# Patient Record
Sex: FEMALE | Race: WHITE | Employment: FULL TIME | URBAN - METROPOLITAN AREA
[De-identification: names, ages, dates, MRNs, and addresses within clinical notes are randomized per-mention and may not be internally consistent; named-entity substitution may affect disease eponyms.]

---

## 2022-08-16 ENCOUNTER — OFFICE VISIT (OUTPATIENT)
Dept: ORTHOPEDIC SURGERY | Age: 47
End: 2022-08-16
Payer: OTHER GOVERNMENT

## 2022-08-16 VITALS — WEIGHT: 160 LBS | HEIGHT: 62 IN | BODY MASS INDEX: 29.44 KG/M2 | TEMPERATURE: 97.1 F

## 2022-08-16 DIAGNOSIS — M75.02 ADHESIVE CAPSULITIS OF LEFT SHOULDER: Primary | ICD-10-CM

## 2022-08-16 DIAGNOSIS — M75.112 INCOMPLETE TEAR OF LEFT ROTATOR CUFF, UNSPECIFIED WHETHER TRAUMATIC: ICD-10-CM

## 2022-08-16 PROCEDURE — 99203 OFFICE O/P NEW LOW 30 MIN: CPT | Performed by: ORTHOPAEDIC SURGERY

## 2022-08-16 RX ORDER — MELOXICAM 15 MG/1
15 TABLET ORAL
Qty: 30 TABLET | Refills: 1 | Status: SHIPPED | OUTPATIENT
Start: 2022-08-16 | End: 2022-08-18 | Stop reason: SDUPTHER

## 2022-08-16 NOTE — PROGRESS NOTES
Angel Ayala  1975   Chief Complaint   Patient presents with    Shoulder Pain     left        HISTORY OF PRESENT ILLNESS  Angel Ayala is a 50 y.o. female who presents today for evaluation of left shoulder pain. Pt referred by Dr. Kristin Zhang. She rates her pain 3/10 today. Pain has been present for 2 months. Cannot recall a specific injury other than when she had to carry an irate child. Reports decreased ROM. Has pain with reaching behind. Has tried following treatments: Injections:NO; Brace:NO; Therapy:NO; Cane/Crutch:NO       Allergies   Allergen Reactions    Sulfa (Sulfonamide Antibiotics) Nausea and Vomiting     Face red, headache        Past Medical History:   Diagnosis Date    Anxiety about health 2007    Atypical chest pain 2007    Dizziness 2007    Occasional, with normal BP and HR. Echocardiogram 11/15/2015    EF 65%. No RWMA. Study is within normal limits.     GERD (gastroesophageal reflux disease)     History of palpitations 2007    Occasional    Hyperlipidemia     Hypertension     Ill-defined condition     Queen Parkinson-White    Nausea & vomiting     requests premed    Nocturia     Overactive bladder     Pyelonephritis     Recurrent UTI     Thyroid disease     hypo    Urinary frequency     Urinary urgency     WPW (Anthony-Parkinson-White syndrome)       Social History     Socioeconomic History    Marital status:      Spouse name: Not on file    Number of children: Not on file    Years of education: Not on file    Highest education level: Not on file   Occupational History    Not on file   Tobacco Use    Smoking status: Never    Smokeless tobacco: Not on file   Substance and Sexual Activity    Alcohol use: No    Drug use: No    Sexual activity: Not on file   Other Topics Concern    Not on file   Social History Narrative    ** Merged History Encounter **          Social Determinants of Health     Financial Resource Strain: Not on file   Food Insecurity: Not on file   Transportation Needs: Not on file   Physical Activity: Not on file   Stress: Not on file   Social Connections: Not on file   Intimate Partner Violence: Not on file   Housing Stability: Not on file      Past Surgical History:   Procedure Laterality Date    HX GYN      3 dand c's    HX HYSTERECTOMY      HX ORTHOPAEDIC      toe growth removed    HX UROLOGICAL  3/10/15    [facility], Cystoscopy, insertion of bilateral indwelling stents. Dr. Sarahi Ortega      Family History   Problem Relation Age of Onset    Heart Attack Maternal Grandmother     Stroke Maternal Grandfather       Current Outpatient Medications   Medication Sig    solifenacin (VESICARE) 5 mg tablet Take 1 Tab by mouth daily. levothyroxine (SYNTHROID) 125 mcg tablet Take 125 mcg by mouth three (3) days a week. Indications: HYPOTHYROIDISM    telmisartan-hydroCHLOROthiazide (MICARDIS HCT) 40-12.5 mg per tablet Take 1 Tab by mouth daily. Indications: HYPERTENSION     No current facility-administered medications for this visit. REVIEW OF SYSTEM   Patient denies: Weight loss, Fever/Chills, HA, Visual changes, Fatigue, Chest pain, SOB, Abdominal pain, N/V/D/C, Blood in stool or urine, Edema. Pertinent positive as above in HPI. All others were negative    PHYSICAL EXAM:   Visit Vitals  Temp 97.1 °F (36.2 °C)   Ht 5' 2\" (1.575 m)   Wt 160 lb (72.6 kg)   BMI 29.26 kg/m²     The patient is a well-developed, well-nourished female   in no acute distress. The patient is alert and oriented times three. The patient is alert and oriented times three. Mood and affect are normal.  LYMPHATIC: lymph nodes are not enlarged and are within normal limits  SKIN: normal in color and non tender to palpation. There are no bruises or abrasions noted. NEUROLOGICAL: Motor sensory exam is within normal limits. Reflexes are equal bilaterally.  There is normal sensation to pinprick and light touch  MUSCULOSKELETAL:  Examination Left shoulder   Skin Intact   AC joint tenderness -   Biceps tenderness - Forward flexion/Elevation    Active abduction    Glenohumeral abduction 70   External rotation ROM 45   Internal rotation ROM 30   Apprehension -   Stans Relocation -   Jerk -   Load and Shift -   Obriens -   Speeds -   Impingement sign +   Supraspinatus/Empty Can +   External Rotation Strength -, 5/5   Lift Off/Belly Press -, 5/5   Neurovascular Intact       IMAGING: MRI of left shoulder dated 8/03/2022 was reviewed and read by Dr. Cabrales Gloria:   IMPRESSION:  1. Small high-grade primarily interstitial and focally deep surface partial tear, posterior supraspinatus critical zone, probably with component of intratendinous edema. Minimal intermediate grade partial tear supraspinatus tendon far anterior leading edge. No full-thickness tear or muscle atrophy. Minimal subacromial-subdeltoid bursal effusion and local peribursal edema, likely reactive low-grade bursitis   2. Minimal proximal intra-articular long head biceps degenerative tendinosis without discrete tendon tear. 3. Intact glenoid labrum. 4. Unremarkable coracoacromial arch. IMPRESSION:      ICD-10-CM ICD-9-CM    1. Adhesive capsulitis of left shoulder  M75.02 726.0       2. Incomplete tear of left rotator cuff, unspecified whether traumatic  M75.112 840.4            PLAN:  1. Pt presents today with left shoulder pain due to adhesive capsulitis and MRI-documented partial RCT and I would like for her to begin PT. Was also provided with prescription for Mobic. Patient was provided with physician-directed home exercise program in the office today. Risk factors include: htn  2. No ultrasound exam indicated today  3. No cortisone injection indicated today   4. Yes Physical/Occupational Therapy indicated today  5. No diagnostic test indicated today:   6. No durable medical equipment indicated today  7. No referral indicated today   8. Yes medications indicated today: MOBIC  9.  No Narcotic indicated today      RTC 4 weeks    Office note will be sent to referring provider. Scribed by Bianca Costa [address] [place] [address] [address]) as dictated by MD NATALIIA Miller, Dr. Capri Brooks, confirm that all documentation is accurate.     Capri Brooks M.D.   [facility]

## 2022-08-18 DIAGNOSIS — M75.02 ADHESIVE CAPSULITIS OF LEFT SHOULDER: ICD-10-CM

## 2022-08-18 DIAGNOSIS — M75.112 INCOMPLETE TEAR OF LEFT ROTATOR CUFF, UNSPECIFIED WHETHER TRAUMATIC: ICD-10-CM

## 2022-08-18 RX ORDER — MELOXICAM 15 MG/1
15 TABLET ORAL
Qty: 90 TABLET | Refills: 0 | Status: SHIPPED | OUTPATIENT
Start: 2022-08-18

## 2022-08-18 NOTE — TELEPHONE ENCOUNTER
Per pharmacy, patient is requesting to dispense a 90 d/s. Please sign if appropriate. Requested Prescriptions     Pending Prescriptions Disp Refills    meloxicam (Mobic) 15 mg tablet 90 Tablet 0     Sig: Take 1 Tablet by mouth daily (with breakfast). For Christopher Estevez in place:   Recommendation Provided To:    Intervention Detail: New Rx: 1, reason: Patient Preference  Gap Closed?:   Intervention Accepted By:   Time Spent (min): 5

## 2022-08-22 ENCOUNTER — HOSPITAL ENCOUNTER (OUTPATIENT)
Dept: PHYSICAL THERAPY | Age: 47
Discharge: HOME | End: 2022-08-22
Attending: ORTHOPAEDIC SURGERY
Payer: OTHER GOVERNMENT

## 2022-08-22 DIAGNOSIS — M75.02 ADHESIVE CAPSULITIS OF LEFT SHOULDER: Primary | ICD-10-CM

## 2022-08-22 DIAGNOSIS — M75.112 INCOMPLETE TEAR OF LEFT ROTATOR CUFF, UNSPECIFIED WHETHER TRAUMATIC: ICD-10-CM

## 2022-08-22 PROCEDURE — 97535 SELF CARE MNGMENT TRAINING: CPT

## 2022-08-22 PROCEDURE — 97161 PT EVAL LOW COMPLEX 20 MIN: CPT

## 2022-08-22 PROCEDURE — 97110 THERAPEUTIC EXERCISES: CPT

## 2022-08-22 NOTE — PROGRESS NOTES
PT DAILY TREATMENT NOTE     Patient Name: Lb Costa  Date:2022  : 1975  [x]  Patient  Verified  Payor: BRANDI / Plan: Evans Wilkins 72 / Product Type:  /    In time:10  Out time:1045am  Total Treatment Time (min): 45  Visit #: 1 of 8    Treatment Area: Adhesive capsulitis of left shoulder [M75.02]  Incomplete tear of left rotator cuff, unspecified whether traumatic [M75.112]    SUBJECTIVE  Pain Level (0-10 scale): 5  Any medication changes, allergies to medications, adverse drug reactions, diagnosis change, or new procedure performed?: [x] No    [] Yes (see summary sheet for update)  Subjective functional status/changes:   [] No changes reported  See eval    OBJECTIVE    20 min [x]Eval                  []Re-Eval       15 min Therapeutic Exercise:  [] See flow sheet :   Rationale: increase ROM and increase strength to improve the patients ability to manage ADLs    15 min Self care/home management:  []  See flow sheet :  pt education on relevant anatomy and pathology as it relates to self care. Discussed edits to exercise routine to emphasize posterior chain and no weight with the left arm. Discussed icing after irritating activity. Rationale:  reduce pain   to improve the patients ability to manage self care. With   [] TE   [] TA   [] neuro   [] other: Patient Education: [x] Review HEP    [] Progressed/Changed HEP based on:   [] positioning   [] body mechanics   [] transfers   [] heat/ice application    [] other:      Other Objective/Functional Measures: see eval   IMAGING: MRI of left shoulder dated 2022 was reviewed and read by Dr. Luis Castro:   IMPRESSION:  1. Small high-grade primarily interstitial and focally deep surface partial tear, posterior supraspinatus critical zone, probably with component of intratendinous edema. Minimal intermediate grade partial tear supraspinatus tendon far anterior leading edge. No full-thickness tear or muscle atrophy. Minimal subacromial-subdeltoid bursal effusion and local peribursal edema, likely reactive low-grade bursitis   2. Minimal proximal intra-articular long head biceps degenerative tendinosis without discrete tendon tear. 3. Intact glenoid labrum. 4. Unremarkable coracoacromial arch. Pain Level (0-10 scale) post treatment: 5    ASSESSMENT/Changes in Function: see POC    Patient will continue to benefit from skilled PT services to modify and progress therapeutic interventions, address functional mobility deficits, address ROM deficits, address strength deficits, analyze and address soft tissue restrictions, analyze and cue movement patterns, analyze and modify body mechanics/ergonomics, assess and modify postural abnormalities, and instruct in home and community integration to attain remaining goals. [x]  See Plan of Care  []  See progress note/recertification  []  See Discharge Summary         Progress towards goals / Updated goals:  Short Term Goals: To be accomplished in 2 weeks:  Pt will report daily compliance with her HEP to maximize therapeutic outcomes. Eval: HEP assigned  Long Term Goals: To be accomplished in 4 weeks:  Pt will improve her left shoulder flexion AROM to 160 deg without shoulder hike and without pain increase to improve ease of reaching into cabinets. Eval: AROM 130 deg with pain  Pt will improve her left shoulder ABD AROM to 145 deg without shoulder hike or pain increase to improve ease of dressing and reaching overhead. Eval: AROM 80 deg with pain  Pt will improve her left shoulder ER/ABD strength to 4+/5 MMT to improve stability with lifting. Eval: 4-/5 with pain  Pt will improve her left biceps strength to 4+/5 MMT without pain to improve ease of lifting and reaching overhead. Eval: 4-/5 with sharp pain  Pt will improve her FOTO to 67, demonstrating improved functional ADL capacity.   Eval: 46    PLAN  []  Upgrade activities as tolerated     [x]  Continue plan of care  [] Update interventions per flow sheet       []  Discharge due to:_  []  Other:_      Aarti Evans, PT 8/22/2022  11:14 AM    Future Appointments   Date Time Provider Maddie Trejo   8/25/2022 12:00 PM Iain Presser, PT [facility]   8/30/2022  3:45 PM Iain Presser, PT [facility]   9/2/2022  3:00 PM Melbourne Presser, PT [facility]   9/7/2022  4:30 PM Laina Fuentes, PTA [facility]   9/9/2022  4:30 PM Laina Fuentes, PTA [facility]   9/12/2022  3:20 PM Segundo Mensah MD [facility]
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
## 2022-08-25 ENCOUNTER — HOSPITAL ENCOUNTER (OUTPATIENT)
Dept: PHYSICAL THERAPY | Age: 47
Discharge: HOME | End: 2022-08-25
Attending: ORTHOPAEDIC SURGERY
Payer: OTHER GOVERNMENT

## 2022-08-25 PROCEDURE — 97140 MANUAL THERAPY 1/> REGIONS: CPT

## 2022-08-25 PROCEDURE — 97110 THERAPEUTIC EXERCISES: CPT

## 2022-08-25 PROCEDURE — 97112 NEUROMUSCULAR REEDUCATION: CPT

## 2022-08-25 NOTE — PROGRESS NOTES
PT DAILY TREATMENT NOTE 10    Patient Name: Luca Lombardo  Date:2022  : 1975  [x]  Patient  Verified  Payor: BRANDI / Plan: Evans Wilkins 74 / Product Type:  /    In time:1200  Out time:1252  Total Treatment Time (min): 52  Visit #: 2 of 8  Treatment Area: Incomplete rotator cuff tear or rupture of left shoulder, not specified as traumatic [M75.112]  Pain in left shoulder [M25.512]    SUBJECTIVE  Pain Level (0-10 scale): 3  Any medication changes, allergies to medications, adverse drug reactions, diagnosis change, or new procedure performed?: [x] No    [] Yes (see summary sheet for update)  Subjective functional status/changes:   [] No changes reported  I've been doing my exercises at home. OBJECTIVE    Modality rationale: decrease pain to improve the patients ability to tolerate post exercise soreness   Min Type Additional Details   10 [x]  Ice     []  heat  []  Ice massage  []  Laser   []  Anodyne Position:s/l  Location:left hsoulder   [] Skin assessment post-treatment:  []intact []redness- no adverse reaction    []redness - adverse reaction:     26 min Therapeutic Exercise:  [] See flow sheet :   Rationale: increase ROM and increase strength to improve the patients ability to perform daily activities      8 min Neuromuscular Re-education:  []  See flow sheet :   Rationale: increase strength  to improve the patients ability to perform overhead reaching with less pain    8 min Manual Therapy:  STM left lev scap, scalenes, UT; grade 2-3 left ST mobs in right s/l; supine PROM with distraction in all directions   The manual therapy interventions were performed at a separate and distinct time from the therapeutic activities interventions.   Rationale: decrease pain, increase ROM, and increase tissue extensibility to perform ADLs  With   [] TE   [] TA   [] neuro   [] other: Patient Education: [x] Review HEP    [] Progressed/Changed HEP based on:   [] positioning   [] body mechanics   [] transfers   [] heat/ice application    [] other:      Other Objective/Functional Measures:      Pain Level (0-10 scale) post treatment: 0    ASSESSMENT/Changes in Function: Soft tissue restrictions noted in left scalenes, lev scap, and lats/teres complex; improvement noted in left ST mobility post manual.    Patient will continue to benefit from skilled PT services to modify and progress therapeutic interventions, address functional mobility deficits, address ROM deficits, address strength deficits, analyze and address soft tissue restrictions, and analyze and cue movement patterns to attain remaining goals. []  See Plan of Care  []  See progress note/recertification  []  See Discharge Summary         Progress towards goals / Updated goals:  Short Term Goals: To be accomplished in 2 weeks:  Pt will report daily compliance with her HEP to maximize therapeutic outcomes. Eval: HEP assigned  Current: goal met per patient report 8/25/22   Long Term Goals: To be accomplished in 4 weeks:  Pt will improve her left shoulder flexion AROM to 160 deg without shoulder hike and without pain increase to improve ease of reaching into cabinets. Eval: AROM 130 deg with pain  Pt will improve her left shoulder ABD AROM to 145 deg without shoulder hike or pain increase to improve ease of dressing and reaching overhead. Eval: AROM 80 deg with pain  Pt will improve her left shoulder ER/ABD strength to 4+/5 MMT to improve stability with lifting. Eval: 4-/5 with pain  Pt will improve her left biceps strength to 4+/5 MMT without pain to improve ease of lifting and reaching overhead. Eval: 4-/5 with sharp pain  Pt will improve her FOTO to 67, demonstrating improved functional ADL capacity.   Eval: 46    PLAN  [x]  Upgrade activities as tolerated     []  Continue plan of care  []  Update interventions per flow sheet       []  Discharge due to:_  []  Other:_      Aletha Mota, DUGLAS, CMTPT 8/25/2022  8:49 AM    Future Appointments   Date Time Provider Maddie Trejo   8/25/2022 12:00 PM Tish Michelle, PT [facility]   8/30/2022  3:45 PM Tish Michelle, PT [facility]   9/2/2022  3:00 PM Tish Michelle, PT [facility]   9/7/2022  4:30 PM Louie Meyer, PTA [facility]   9/9/2022  4:30 PM Louie Meyer, PTA [facility]   9/12/2022  3:20 PM Chula Ding MD [facility]

## 2022-08-30 ENCOUNTER — HOSPITAL ENCOUNTER (OUTPATIENT)
Dept: PHYSICAL THERAPY | Age: 47
Discharge: HOME | End: 2022-08-30
Attending: ORTHOPAEDIC SURGERY
Payer: OTHER GOVERNMENT

## 2022-08-30 PROCEDURE — 97140 MANUAL THERAPY 1/> REGIONS: CPT

## 2022-08-30 PROCEDURE — 97112 NEUROMUSCULAR REEDUCATION: CPT

## 2022-08-30 PROCEDURE — 97110 THERAPEUTIC EXERCISES: CPT

## 2022-08-30 NOTE — PROGRESS NOTES
PT DAILY TREATMENT NOTE 10-18    Patient Name: Anuj Girard  Date:2022  : 1975  [x]  Patient  Verified  Payor: BRANDI / Plan: Evans Wilkins 74 / Product Type:  /    In time:345  Out time:422  Total Treatment Time (min): 37  Visit #: 3 of 8    Treatment Area: Incomplete rotator cuff tear or rupture of left shoulder, not specified as traumatic [M75.112]  Pain in left shoulder [M25.512]    SUBJECTIVE  Pain Level (0-10 scale): 0  Any medication changes, allergies to medications, adverse drug reactions, diagnosis change, or new procedure performed?: [x] No    [] Yes (see summary sheet for update)  Subjective functional status/changes:   [] No changes reported  It's not been hurting me like it was. I went back to work yesterday and had to do a lot of lifting. OBJECTIVE    21 min Therapeutic Exercise:  [] See flow sheet :   Rationale: increase ROM and increase strength to improve the patients ability to perform daily activities      8 min Neuromuscular Re-education:  []  See flow sheet :   Rationale: increase strength  to improve the patients ability to perform overhead activ    8 min Manual Therapy:  STM left lev scap, scalenes, UT; grade 2-3 left ST mobs in right s/l; supine PROM with distraction in all directions   The manual therapy interventions were performed at a separate and distinct time from the therapeutic activities interventions. Rationale: decrease pain, increase ROM, and increase tissue extensibility to perform daily activities   With   [] TE   [] TA   [] neuro   [] other: Patient Education: [x] Review HEP    [] Progressed/Changed HEP based on:   [] positioning   [] body mechanics   [] transfers   [] heat/ice application    [] other:      Other Objective/Functional Measures:      Pain Level (0-10 scale) post treatment: 0    ASSESSMENT/Changes in Function: Active shoulder flexion has improved by 25 degrees and patient reports no pain with AROM today.  Added 1/2 prone scapular exercises to work on stability. Continues to have mm restrictions in left periscapular region. Patient will continue to benefit from skilled PT services to modify and progress therapeutic interventions, address functional mobility deficits, address ROM deficits, address strength deficits, analyze and address soft tissue restrictions, and analyze and cue movement patterns to attain remaining goals. []  See Plan of Care  []  See progress note/recertification  []  See Discharge Summary         Progress towards goals / Updated goals:  Short Term Goals: To be accomplished in 2 weeks:  Pt will report daily compliance with her HEP to maximize therapeutic outcomes. Eval: HEP assigned  Current: goal met per patient report 8/25/22           Long Term Goals: To be accomplished in 4 weeks:  Pt will improve her left shoulder flexion AROM to 160 deg without shoulder hike and without pain increase to improve ease of reaching into cabinets. Eval: AROM 130 deg with pain  Current: 155 degrees 8/30/22  Pt will improve her left shoulder ABD AROM to 145 deg without shoulder hike or pain increase to improve ease of dressing and reaching overhead. Eval: AROM 80 deg with pain  Pt will improve her left shoulder ER/ABD strength to 4+/5 MMT to improve stability with lifting. Eval: 4-/5 with pain  Pt will improve her left biceps strength to 4+/5 MMT without pain to improve ease of lifting and reaching overhead. Eval: 4-/5 with sharp pain  Pt will improve her FOTO to 67, demonstrating improved functional ADL capacity.   Eval: 51    PLAN  [x]  Upgrade activities as tolerated     []  Continue plan of care  []  Update interventions per flow sheet       []  Discharge due to:_  []  Other:_      Marcelle Weber, DUGLAS, CMTPT 8/30/2022  8:51 AM    Future Appointments   Date Time Provider Maddie Trejo   8/30/2022  3:45 PM [facility]   9/2/2022  3:00 PM [facility]   9/7/2022  4:30 PM [facility]   9/9/2022  4:30 PM [facility]   9/12/2022  3:20 PM Jose Dee MD [facility] BS AMB

## 2022-09-07 ENCOUNTER — HOSPITAL ENCOUNTER (OUTPATIENT)
Dept: PHYSICAL THERAPY | Age: 47
Discharge: HOME | End: 2022-09-07
Attending: ORTHOPAEDIC SURGERY
Payer: OTHER GOVERNMENT

## 2022-09-07 PROCEDURE — 97112 NEUROMUSCULAR REEDUCATION: CPT

## 2022-09-07 PROCEDURE — 97110 THERAPEUTIC EXERCISES: CPT

## 2022-09-07 PROCEDURE — 97140 MANUAL THERAPY 1/> REGIONS: CPT

## 2022-09-07 NOTE — PROGRESS NOTES
PT DAILY TREATMENT NOTE     Patient Name: Jensen Ramirez  Date:2022  : 1975  [x]  Patient  Verified  Payor:  / Plan: Holley Rivera / Product Type:  /    In time:4:30  Out time:5:16  Total Treatment Time (min): 46  Visit #: 4 of 8    Treatment Area: Incomplete rotator cuff tear or rupture of left shoulder, not specified as traumatic [M75.112]  Pain in left shoulder [M25.512]    SUBJECTIVE  Pain Level (0-10 scale): 0  Any medication changes, allergies to medications, adverse drug reactions, diagnosis change, or new procedure performed?: [x] No    [] Yes (see summary sheet for update)  Subjective functional status/changes:   [] No changes reported  Pt reports no pain coming in today. Pt states she has been doing her exercises and stretching and that has really helped with her discomfort. OBJECTIVE        28 min Therapeutic Exercise:  [x] See flow sheet :   Rationale: increase ROM, increase strength, and improve coordination to improve the patients ability to perform functional task with ease. 10 min Neuromuscular Re-education:  [x]  See flow sheet :   Rationale: increase strength, improve coordination, and increase proprioception  to improve the patients ability to perform ADL's with ease. 8 min Manual Therapy:  STM left lev scap, scalenes, UT; grade 2-3 left ST mobs in right s/l; supine PROM with distraction in all directions   The manual therapy interventions were performed at a separate and distinct time from the therapeutic activities interventions. Rationale: decrease pain, increase ROM, and increase tissue extensibility to improve functional mobility with overhead reaching ADL's.     With   [] TE   [] TA   [] neuro   [] other: Patient Education: [x] Review HEP    [] Progressed/Changed HEP based on:   [] positioning   [] body mechanics   [] transfers   [] heat/ice application    [] other:      Other Objective/Functional Measures:      Pain Level (0-10 scale) post treatment: 0    ASSESSMENT/Changes in Function:   No pain reported with therex performance with pt displaying good mobility and ROM throughout exercises. The pt displays excellent PROM of the left shoulder and reports no end range pain. Continued treatment to improve strength and stability of the left shoulder to aid with ease of everyday activities. Patient will continue to benefit from skilled PT services to modify and progress therapeutic interventions, address functional mobility deficits, address ROM deficits, address strength deficits, analyze and address soft tissue restrictions, analyze and cue movement patterns, analyze and modify body mechanics/ergonomics, and assess and modify postural abnormalities to attain remaining goals. [x]  See Plan of Care  []  See progress note/recertification  []  See Discharge Summary         Progress towards goals / Updated goals:  Short Term Goals: To be accomplished in 2 weeks:  Pt will report daily compliance with her HEP to maximize therapeutic outcomes. Eval: HEP assigned  Current: goal met per patient report 8/25/22           Long Term Goals: To be accomplished in 4 weeks:  Pt will improve her left shoulder flexion AROM to 160 deg without shoulder hike and without pain increase to improve ease of reaching into cabinets. Eval: AROM 130 deg with pain  Current: 155 degrees 8/30/22  Pt will improve her left shoulder ABD AROM to 145 deg without shoulder hike or pain increase to improve ease of dressing and reaching overhead. Eval: AROM 80 deg with pain  Pt will improve her left shoulder ER/ABD strength to 4+/5 MMT to improve stability with lifting. Eval: 4-/5 with pain  Pt will improve her left biceps strength to 4+/5 MMT without pain to improve ease of lifting and reaching overhead. Eval: 4-/5 with sharp pain  Pt will improve her FOTO to 67, demonstrating improved functional ADL capacity.   Eval: 46       PLAN  []  Upgrade activities as tolerated [x]  Continue plan of care  []  Update interventions per flow sheet       []  Discharge due to:_  []  Other:_      Ryan Lee PTA 9/7/2022  4:36 PM    Future Appointments   Date Time Provider Maddie Trejo   9/12/2022  3:20 PM Shyam Gutierrez MD [facility] BS AMB

## 2022-09-09 ENCOUNTER — APPOINTMENT (OUTPATIENT)
Dept: PHYSICAL THERAPY | Age: 47
End: 2022-09-09
Attending: ORTHOPAEDIC SURGERY
Payer: OTHER GOVERNMENT

## 2022-09-14 ENCOUNTER — TELEPHONE (OUTPATIENT)
Dept: PHYSICAL THERAPY | Age: 47
End: 2022-09-14

## 2022-09-26 ENCOUNTER — TELEPHONE (OUTPATIENT)
Dept: PHYSICAL THERAPY | Age: 47
End: 2022-09-26

## 2022-10-03 ENCOUNTER — TELEPHONE (OUTPATIENT)
Dept: PHYSICAL THERAPY | Age: 47
End: 2022-10-03

## 2022-10-12 ENCOUNTER — TELEPHONE (OUTPATIENT)
Dept: PHYSICAL THERAPY | Age: 47
End: 2022-10-12
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
## 2025-06-20 ENCOUNTER — OFFICE VISIT (OUTPATIENT)
Age: 50
End: 2025-06-20
Payer: OTHER GOVERNMENT

## 2025-06-20 VITALS
DIASTOLIC BLOOD PRESSURE: 82 MMHG | OXYGEN SATURATION: 100 % | BODY MASS INDEX: 30.36 KG/M2 | SYSTOLIC BLOOD PRESSURE: 118 MMHG | HEART RATE: 77 BPM | WEIGHT: 165 LBS | HEIGHT: 62 IN

## 2025-06-20 DIAGNOSIS — R00.2 PALPITATIONS: ICD-10-CM

## 2025-06-20 DIAGNOSIS — I45.6 WPW (WOLFF-PARKINSON-WHITE SYNDROME): Primary | ICD-10-CM

## 2025-06-20 DIAGNOSIS — I47.10 SVT (SUPRAVENTRICULAR TACHYCARDIA): ICD-10-CM

## 2025-06-20 PROCEDURE — 99204 OFFICE O/P NEW MOD 45 MIN: CPT | Performed by: INTERNAL MEDICINE

## 2025-06-20 PROCEDURE — 93000 ELECTROCARDIOGRAM COMPLETE: CPT | Performed by: INTERNAL MEDICINE

## 2025-06-20 RX ORDER — ALPRAZOLAM 0.5 MG
TABLET ORAL
COMMUNITY
Start: 2025-06-06

## 2025-06-20 RX ORDER — OMEPRAZOLE 40 MG/1
CAPSULE, DELAYED RELEASE ORAL
COMMUNITY
Start: 2025-05-18

## 2025-06-20 RX ORDER — PANTOPRAZOLE SODIUM 40 MG/1
40 TABLET, DELAYED RELEASE ORAL DAILY
COMMUNITY
Start: 2025-04-03

## 2025-06-20 RX ORDER — ATORVASTATIN CALCIUM 20 MG/1
20 TABLET, FILM COATED ORAL DAILY
COMMUNITY
Start: 2025-06-08

## 2025-06-20 RX ORDER — ESCITALOPRAM OXALATE 10 MG/1
15 TABLET ORAL DAILY
COMMUNITY
Start: 2025-04-02

## 2025-06-20 RX ORDER — TELMISARTAN 40 MG/1
40 TABLET ORAL DAILY
COMMUNITY
Start: 2025-06-06

## 2025-06-20 NOTE — PROGRESS NOTES
HPI:  A 50-year-old female here to reestablish care.  I last saw her in 2016.  She has history of preexcitation on EKG that was evaluated in the hospital in the setting of pyelonephritis and sepsis.  She has thyroid disorder.  She was doing well up to about four to five months ago.  She has been waking at night every few weeks with heart racing although she checked her pulse and it has not been fast.  She has a grandchild who was born with congenital heart and kidney issues, been under a lot of stress as a result.  She has not had any other syncope.  She has gained about 15 pounds over the past 6-12 months.  Her  has sleep apnea and so they are not sure if she actually snores consistently.    Impression:  1. 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  We had talked about EP study at that time versus close monitoring in 2016 and close monitoring was decided.  2. Recent increase in palpitations over the past few months with worsening stressors having a grandchild with congenital heart and kidney issue.  3. Abnormal EKG, now more left bundle morphology compared to 2016.  4. Hypertension, controlled on telmisartan.  5. Thyroid disorder, on levothyroxine.  Recent physical with normal levels.    Plan:  She has previous history of WPW, more preexcitation.  Her EKG is now more evolved in a left bundle.  She does not have any significant shortness of breath or chest pain or syncope but has nocturnal palpitations.  She has gained about 15 pounds and is under a lot of stress.  I am going to obtain event monitor for a month as well as echocardiogram.  She has been referred to sleep medicine.  She may have an underlying sleep disorder as a cause for these symptoms.  When I see her back, I will make final recommendations.        Wt Readings from Last 3 Encounters:   06/20/25 74.8 kg (165 lb)   08/16/22 72.6 kg (160 lb)     Past Medical History:   Diagnosis Date    Anxiety about health 2007    Atypical chest pain 2007

## 2025-07-01 PROBLEM — I10 ESSENTIAL HYPERTENSION: Status: ACTIVE | Noted: 2025-07-01

## 2025-07-01 PROBLEM — E03.9 HYPOTHYROIDISM: Status: ACTIVE | Noted: 2025-07-01

## 2025-07-01 PROBLEM — I45.6 WPW (WOLFF-PARKINSON-WHITE SYNDROME): Status: ACTIVE | Noted: 2025-07-01

## 2025-07-01 PROBLEM — F41.9 ANXIETY: Status: ACTIVE | Noted: 2025-07-01

## 2025-07-01 ASSESSMENT — SLEEP AND FATIGUE QUESTIONNAIRES
HOW LIKELY ARE YOU TO NOD OFF OR FALL ASLEEP WHILE WATCHING TV: SLIGHT CHANCE OF DOZING
HOW LIKELY ARE YOU TO NOD OFF OR FALL ASLEEP IN A CAR, WHILE STOPPED FOR A FEW MINUTES IN TRAFFIC: WOULD NEVER DOZE
HOW LIKELY ARE YOU TO NOD OFF OR FALL ASLEEP WHILE SITTING AND READING: SLIGHT CHANCE OF DOZING
HOW LIKELY ARE YOU TO NOD OFF OR FALL ASLEEP WHILE SITTING AND TALKING TO SOMEONE: WOULD NEVER DOZE
HOW LIKELY ARE YOU TO NOD OFF OR FALL ASLEEP WHILE SITTING AND READING: SLIGHT CHANCE OF DOZING
HOW LIKELY ARE YOU TO NOD OFF OR FALL ASLEEP WHILE SITTING INACTIVE IN A PUBLIC PLACE: WOULD NEVER DOZE
HOW LIKELY ARE YOU TO NOD OFF OR FALL ASLEEP WHILE LYING DOWN TO REST IN THE AFTERNOON WHEN CIRCUMSTANCES PERMIT: MODERATE CHANCE OF DOZING
HOW LIKELY ARE YOU TO NOD OFF OR FALL ASLEEP WHILE SITTING QUIETLY AFTER LUNCH WITHOUT ALCOHOL: WOULD NEVER DOZE
HOW LIKELY ARE YOU TO NOD OFF OR FALL ASLEEP WHILE SITTING INACTIVE IN A PUBLIC PLACE: WOULD NEVER DOZE
HOW LIKELY ARE YOU TO NOD OFF OR FALL ASLEEP WHILE SITTING QUIETLY AFTER LUNCH WITHOUT ALCOHOL: WOULD NEVER DOZE
HOW LIKELY ARE YOU TO NOD OFF OR FALL ASLEEP IN A CAR, WHILE STOPPED FOR A FEW MINUTES IN TRAFFIC: WOULD NEVER DOZE
ESS TOTAL SCORE: 5
HOW LIKELY ARE YOU TO NOD OFF OR FALL ASLEEP WHILE SITTING AND TALKING TO SOMEONE: WOULD NEVER DOZE
HOW LIKELY ARE YOU TO NOD OFF OR FALL ASLEEP WHEN YOU ARE A PASSENGER IN A CAR FOR AN HOUR WITHOUT A BREAK: SLIGHT CHANCE OF DOZING
HOW LIKELY ARE YOU TO NOD OFF OR FALL ASLEEP WHILE LYING DOWN TO REST IN THE AFTERNOON WHEN CIRCUMSTANCES PERMIT: MODERATE CHANCE OF DOZING
HOW LIKELY ARE YOU TO NOD OFF OR FALL ASLEEP WHEN YOU ARE A PASSENGER IN A CAR FOR AN HOUR WITHOUT A BREAK: SLIGHT CHANCE OF DOZING
HOW LIKELY ARE YOU TO NOD OFF OR FALL ASLEEP WHILE WATCHING TV: SLIGHT CHANCE OF DOZING

## 2025-07-01 NOTE — PATIENT INSTRUCTIONS
Please make a follow up appointment to discuss the results of your sleep study or I will send you a \"my chart\" message with your results and treatment options.     The [facility] Sleep Lab is located in the [employer] [facility], adjacent to [facility]. The lab is on the second floor. The direct number to call for sleep study related questions is: [phone].    Please call our clinic back at [phone] or send a message on [employer] if you have any questions or concerns or if you are experiencing any of the following:     You have not received a follow up appointment within 30 days prior the recommended follow up time.    If you are not tolerating treatment plan and/or not able to obtain equipment or prescribed medication(s).  if you are experiencing any difficulties with the Durable Medical Equipment  (DME) Company you may be using or is assigned to you.  Two weeks have passed and you have not received an appointment for a scheduled procedure.  Two weeks have passed since you underwent a test and/or procedure and you have not received your results.  Your  Durable Medical Equipment (DME ) company is supposed to provide you with replacement filters, tubing and masks. You can either call your DME when you need new supplies or you can arrange for an automatic shipment schedule.    Your need to be seen by our office at lat minimum of every 12 months in order to renew the prescription for these supplies.   Please make note of who your DME company is and their phone number.   Please make sure that you clean your mask and hosing on a regular basis.  Your DME can provide you with additional information regarding proper care and cleaning of your device     Safety is strongly encouraged.  You should not drive if sleepy, tired, distracted and/or fatigued.      Chest Xrays and blood work do not require appointments.  They are considered \"Walk-In\" services and can be obtained at either [facility] or [facility]

## 2025-07-01 NOTE — ASSESSMENT & PLAN NOTE
Reported history of, Monitored by specialist- no acute findings meriting change in the plan, managed by Dr. Perez (cardiology).  The patient stated that she has a history of this but now has been told she has a bundle branch block.  Is currently undergoing cardiac workup to include a cardiac event monitor and an echocardiogram and has cardiology follow-up in August.

## 2025-07-01 NOTE — PROGRESS NOTES
[facility]   Sleep Medicine     Office Progress Note - Initial Evaluation        [address]  [address]  [facility] [address] [phone] Fax    Reason for visit/referral: Evaluation for possible obstructive sleep apnea/sleep disordered breathing  Assessment:      1. Suspected sleep apnea  -     PAT - Home Sleep Test; Future  2. Snoring  3. Bruxism, sleep-related  Comments:  Wears   4. Anxiety  Assessment & Plan:   Chronic, at goal (stable), continue current treatment plan, takes Lexapro, Xanax prn, managed by PCP.   5. Essential hypertension  Assessment & Plan:   Mildly elevated today, 140/78, managed by PCP, asymptomatic  6. WPW (Alvaro-Parkinson-White syndrome)  Assessment & Plan:  Reported history of, Monitored by specialist- no acute findings meriting change in the plan, managed by Dr. Perez (cardiology).  The patient stated that she has a history of this but now has been told she has a bundle branch block.  Is currently undergoing cardiac workup to include a cardiac event monitor and an echocardiogram and has cardiology follow-up in August.  7. Hypothyroidism, unspecified type     Loree Armendariz is a 50-year-old female with a history of hypertension, WPW, palpitations, hypothyroidism, anxiety and hyperlipidemia.    Her sleep complaints are the following: Historically being told she snores and bruxism.,  She does wear a nightguard.    Her  has Obstructive Sleep Apnea and uses a CPAP machine.  He has told her in the past that she snores but has not mentioned anything recently.    I have discussed the nature and definition of obstructive sleep apnea and why it would be important to evaluate for this.  We did discuss how undiagnosed/untreated obstructive sleep apnea can affect other medical conditions/disorders, and in particular, cardiovascular disorders.  The consequences of untreated obstructive sleep apnea include the increased risk of

## 2025-07-02 ENCOUNTER — OFFICE VISIT (OUTPATIENT)
Age: 50
End: 2025-07-02
Payer: OTHER GOVERNMENT

## 2025-07-02 VITALS
OXYGEN SATURATION: 97 % | HEART RATE: 85 BPM | SYSTOLIC BLOOD PRESSURE: 140 MMHG | TEMPERATURE: 98.1 F | HEIGHT: 62 IN | RESPIRATION RATE: 19 BRPM | BODY MASS INDEX: 30.55 KG/M2 | WEIGHT: 166 LBS | DIASTOLIC BLOOD PRESSURE: 78 MMHG

## 2025-07-02 DIAGNOSIS — R06.83 SNORING: ICD-10-CM

## 2025-07-02 DIAGNOSIS — F41.9 ANXIETY: ICD-10-CM

## 2025-07-02 DIAGNOSIS — G47.63 BRUXISM, SLEEP-RELATED: ICD-10-CM

## 2025-07-02 DIAGNOSIS — I10 ESSENTIAL HYPERTENSION: ICD-10-CM

## 2025-07-02 DIAGNOSIS — R29.818 SUSPECTED SLEEP APNEA: Primary | ICD-10-CM

## 2025-07-02 DIAGNOSIS — I45.6 WPW (WOLFF-PARKINSON-WHITE SYNDROME): ICD-10-CM

## 2025-07-02 DIAGNOSIS — E03.9 HYPOTHYROIDISM, UNSPECIFIED TYPE: ICD-10-CM

## 2025-07-02 PROCEDURE — 3078F DIAST BP <80 MM HG: CPT | Performed by: OTOLARYNGOLOGY

## 2025-07-02 PROCEDURE — 99204 OFFICE O/P NEW MOD 45 MIN: CPT | Performed by: OTOLARYNGOLOGY

## 2025-07-02 PROCEDURE — 3077F SYST BP >= 140 MM HG: CPT | Performed by: OTOLARYNGOLOGY

## 2025-07-02 ASSESSMENT — PATIENT HEALTH QUESTIONNAIRE - PHQ9
2. FEELING DOWN, DEPRESSED OR HOPELESS: NOT AT ALL
SUM OF ALL RESPONSES TO PHQ QUESTIONS 1-9: 0
SUM OF ALL RESPONSES TO PHQ QUESTIONS 1-9: 0
1. LITTLE INTEREST OR PLEASURE IN DOING THINGS: NOT AT ALL
SUM OF ALL RESPONSES TO PHQ QUESTIONS 1-9: 0
SUM OF ALL RESPONSES TO PHQ QUESTIONS 1-9: 0

## 2025-07-02 NOTE — PROGRESS NOTES
Loree Armendariz presents today for   Chief Complaint   Patient presents with    Sleep Problem    Palpitations       Is someone accompanying this pt? no    Is the patient using any DME equipment during OV? no    -DME Company: N/A    Have you ever had a sleep study done before? no    Depression Screenin/2/2025    11:31 AM   PHQ-9    Little interest or pleasure in doing things 0   Feeling down, depressed, or hopeless 0   PHQ-2 Score 0   PHQ-9 Total Score 0        [place] Sleepiness Scale:      2025     8:22 PM   Sleep Medicine   Sitting and reading 1   Watching TV 1   Sitting, inactive in a public place (e.g. a theatre or a meeting) 0   As a passenger in a car for an hour without a break 1   Lying down to rest in the afternoon when circumstances permit 2   Sitting and talking to someone 0   Sitting quietly after a lunch without alcohol 0   In a car, while stopped for a few minutes in traffic 0   [place] Sleepiness Score 5    Neck (Inches) 11.5       Patient-reported       Stop-Ban/2/2025    11:00 AM   STOP-BANG QUESTIONNAIRE   Are you a loud and/or regular snorer? 0   Do you often feel tired or groggy upon awakening or do you awaken with a headache? 1   Have you been observed to gasp or stop breathing during sleep? 0   Are you often tired or fatigued during wake time hours?  0   Do you fall asleep sitting, reading, watching TV or driving? 0   Do you often have problems with memory or concentration? 0   Do you have or are you being treated for high blood pressure? 1   Recent BMI (Calculated) 30.2   Is BMI greater than 35 kg/m2? 0=No   Age older than 50 years old? 1=Yes   Is your neck circumference greater than 17 inches (Male) or 16 inches (Female)? 0   Gender - Male 0=No   STOP-Bang Total Score 33.2         Coordination of Care:  1. Have you been to the ER, urgent care clinic since your last visit? Hospitalized since your last visit? no    2. Have you seen or consulted any other health care

## 2025-07-02 NOTE — ASSESSMENT & PLAN NOTE
Chronic, at goal (stable), continue current treatment plan, takes Lexapro, Xanax prn, managed by PCP.

## 2025-07-29 ENCOUNTER — HOSPITAL ENCOUNTER (OUTPATIENT)
Dept: SLEEP MEDICINE | Facility: HOSPITAL | Age: 50
Discharge: HOME | End: 2025-08-01
Attending: OTOLARYNGOLOGY
Payer: OTHER GOVERNMENT

## 2025-07-29 DIAGNOSIS — R29.818 SUSPECTED SLEEP APNEA: ICD-10-CM

## 2025-07-29 PROCEDURE — 95800 SLP STDY UNATTENDED: CPT

## 2025-08-12 PROBLEM — G47.33 OSA (OBSTRUCTIVE SLEEP APNEA): Status: ACTIVE | Noted: 2025-08-12

## 2025-08-18 DIAGNOSIS — G47.33 OSA (OBSTRUCTIVE SLEEP APNEA): Primary | ICD-10-CM

## 2025-08-27 ENCOUNTER — OFFICE VISIT (OUTPATIENT)
Age: 50
End: 2025-08-27
Payer: OTHER GOVERNMENT

## 2025-08-27 VITALS
OXYGEN SATURATION: 94 % | HEART RATE: 106 BPM | DIASTOLIC BLOOD PRESSURE: 68 MMHG | SYSTOLIC BLOOD PRESSURE: 136 MMHG | WEIGHT: 166 LBS | HEIGHT: 62 IN | BODY MASS INDEX: 30.55 KG/M2

## 2025-08-27 DIAGNOSIS — R00.2 PALPITATIONS: ICD-10-CM

## 2025-08-27 DIAGNOSIS — I45.6 WPW (WOLFF-PARKINSON-WHITE SYNDROME): Primary | ICD-10-CM

## 2025-08-27 DIAGNOSIS — I47.10 SVT (SUPRAVENTRICULAR TACHYCARDIA): ICD-10-CM

## 2025-08-27 PROCEDURE — 99214 OFFICE O/P EST MOD 30 MIN: CPT | Performed by: INTERNAL MEDICINE

## 2025-08-27 PROCEDURE — 3078F DIAST BP <80 MM HG: CPT | Performed by: INTERNAL MEDICINE

## 2025-08-27 PROCEDURE — 3075F SYST BP GE 130 - 139MM HG: CPT | Performed by: INTERNAL MEDICINE
